# Patient Record
Sex: FEMALE | Race: ASIAN | NOT HISPANIC OR LATINO | Employment: FULL TIME | URBAN - METROPOLITAN AREA
[De-identification: names, ages, dates, MRNs, and addresses within clinical notes are randomized per-mention and may not be internally consistent; named-entity substitution may affect disease eponyms.]

---

## 2022-01-06 ENCOUNTER — TRANSFERRED RECORDS (OUTPATIENT)
Dept: HEALTH INFORMATION MANAGEMENT | Facility: CLINIC | Age: 37
End: 2022-01-06
Payer: COMMERCIAL

## 2022-01-07 ENCOUNTER — TRANSFERRED RECORDS (OUTPATIENT)
Dept: HEALTH INFORMATION MANAGEMENT | Facility: CLINIC | Age: 37
End: 2022-01-07
Payer: COMMERCIAL

## 2022-01-11 DIAGNOSIS — R42 DIZZINESS: Primary | ICD-10-CM

## 2022-01-11 NOTE — TELEPHONE ENCOUNTER
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 VEMP and WIN before appt    RECORDS REQUESTED FROM:       Clinic name Comments Records Status Imaging Status   [facility]  1/7/22 -  12/2/21 notes from Sharri IRVIN  1/7/22 audiogram  Emailed MONE 1/11/22 - req 1/12/22 - received     [facility]  1/6/22 CT Tem[facility] everywhere  req 1/11/22- PACS                             1/11/22 3:23PM sent a fax to [facility] for images, emailed MONE to patient - Amay   1/12/22 2:11pm images received in PACS, signed MONE received via fax, sent a fax for recs - Amay  *recs from [facility] received and sent to scan - Amay

## 2022-01-17 ENCOUNTER — OFFICE VISIT (OUTPATIENT)
Dept: AUDIOLOGY | Facility: CLINIC | Age: 37
End: 2022-01-17
Attending: OTOLARYNGOLOGY
Payer: COMMERCIAL

## 2022-01-17 DIAGNOSIS — H93.8X2 EAR PRESSURE, LEFT: Primary | ICD-10-CM

## 2022-01-17 PROCEDURE — 92519 VEMP TST I&R CERVICAL&OCULAR: CPT | Performed by: AUDIOLOGIST

## 2022-01-17 PROCEDURE — 92567 TYMPANOMETRY: CPT | Performed by: AUDIOLOGIST

## 2022-01-17 NOTE — PROGRESS NOTES
.AUDIOLOGY REPORT    SUBJECTIVE: Ankita Quintana was seen in the Audiology Clinic at the [facility] on 1/17/2022 for a vestibular evoked myogenic potential (VEMP) evaluation, referred by Ama Cosby M.D. Ankita reports onset of left ear pain mid November 2021.  She reports being diagnosed with an abscess on her left pinna near the entrance of the ear canal.  She was seen at [facility] and the abscess was drained.  She reports that abscess recurred and was drained again in early to mid December.  She notes the ear pain radiated up the left side of her head and also into her neck as well as having ear and head fullness on that side.  She reports a zapping feeling when her left ear is touched on the area where the abscess was.  She reports the neck pain and stiffness worsens when turning her head to the left.  She reports continued fluctuating symptoms of pressure/fullness on the left side of her head as well as pain that radiates to the neck, and tingling on that side of the face that comes and goes with the other symptoms.  She reports that her symptoms seem to be worse toward the end of the day, and are better upon waking up in the morning.  She reports being treated with steroids recently (Medrol Dosepak), and feels the symptoms may have gotten a little better since then.  She reports frequent popping in the left ear as well.  She also notes tenderness on the left side of her scalp.  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 she denies any dizziness or issues with imbalance.  She denies any falls in the past year.  She does not have any symptoms triggered by loud sounds, or pressure changes such as coughing, sneezing, or blowing her nose.  She denies autophony, or hearing her eye blinks.     The patient reports a history of migraines which were more frequent  in the past.  She reports having occasional migraines now, however has not experienced one since the onset of her current symptoms.  She denies vision concerns (blurred or double vision), history of eye surgeries, head trauma, or other major medical conditions.    Hearing evaluations have revealed normal hearing bilaterally.  The patient denies fluctuations in hearing, tinnitus, or drainage from the ears.  She reports having a left myringotomy about 10 to 15 years ago, and denies any other history of ear surgeries.    OBJECTIVE:     VEMP testing is performed using an auditory evoked potentials system. Surface electrodes are placed in several locations on the patient's head and neck in order to record muscle motoneuron activity in response to loud auditory stimuli. This testing assesses very specific vestibular pathways in the inner ear and central nervous system. cVEMP testing is performed with electrodes placed on the patient's sternocleidomastoid muscle, and is used to assess the saccular organ, afferent inferior vestibular nerve and vestibular nuclei within the brainstem. oVEMP testing is performed with electrodes placed under the patient's eyes, and is used to assess the utricle and afferent superior vestibular nerve and nuclei within the brainstem.  Patients that may benefit from this procedure are those with complaints of vertigo and imbalance or those suspected of superior canal dehiscence. A total of 90 minutes was spent completing the VEMP testing today.    Tympanograms      RIGHT: normal eardrum mobility bilaterally.     LEFT: normal eardrum mobility bilaterally    cVEMP Thresholds via 500 Hz toneburst:    RIGHT: 85 dB nHL which  suggest normal saccular and inferior vestibular nerve function    LEFT: 90 dB nHL which  suggest normal saccular and inferior vestibular nerve function    AMPLITUDE ASYMMETRY: 25%, right more robust (greater than 33% is considered abnormal)    oVEMP Thresholds via 500 Hz  toneburst:     RIGHT: 90 dB nHL which suggests normal utricle and superior vestibular nerve function    LEFT: 90 dB nHL which suggests normal utricle and superior vestibular nerve function    AMPLITUDE ASYMMETRY: 15%, right more robust (greater than 33% is considered abnormal)    ASSESSMENT: Today's results suggest a normal VEMP evaluation. These results suggest normal saccular and inferior vestibular nerve function and normal utricle and superior vestibular nerve function.      PLAN:The patient will follow up with Ama Cosby M.D. for medical management. Please call this clinic with questions regarding these results or recommendations.      Mariola Gonzalez.  Licensed Audiologist  [place] # [id number]

## 2022-01-18 ENCOUNTER — OFFICE VISIT (OUTPATIENT)
Dept: AUDIOLOGY | Facility: CLINIC | Age: 37
End: 2022-01-18
Payer: COMMERCIAL

## 2022-01-18 ENCOUNTER — OFFICE VISIT (OUTPATIENT)
Dept: OTOLARYNGOLOGY | Facility: CLINIC | Age: 37
End: 2022-01-18
Payer: COMMERCIAL

## 2022-01-18 ENCOUNTER — PRE VISIT (OUTPATIENT)
Dept: OTOLARYNGOLOGY | Facility: CLINIC | Age: 37
End: 2022-01-18
Payer: COMMERCIAL

## 2022-01-18 VITALS
DIASTOLIC BLOOD PRESSURE: 82 MMHG | OXYGEN SATURATION: 100 % | BODY MASS INDEX: 24.32 KG/M2 | HEART RATE: 82 BPM | SYSTOLIC BLOOD PRESSURE: 120 MMHG | WEIGHT: 146 LBS | TEMPERATURE: 97.8 F | HEIGHT: 65 IN

## 2022-01-18 DIAGNOSIS — Q18.1 AURICULAR CYST: Primary | ICD-10-CM

## 2022-01-18 DIAGNOSIS — R42 DIZZINESS: ICD-10-CM

## 2022-01-18 DIAGNOSIS — R93.89 ABNORMAL FINDING ON CT SCAN: ICD-10-CM

## 2022-01-18 DIAGNOSIS — H93.8X2 EAR PRESSURE, LEFT: Primary | ICD-10-CM

## 2022-01-18 PROCEDURE — 99203 OFFICE O/P NEW LOW 30 MIN: CPT | Mod: 25 | Performed by: OTOLARYNGOLOGY

## 2022-01-18 PROCEDURE — 92557 COMPREHENSIVE HEARING TEST: CPT | Performed by: AUDIOLOGIST

## 2022-01-18 PROCEDURE — 92550 TYMPANOMETRY & REFLEX THRESH: CPT | Performed by: AUDIOLOGIST

## 2022-01-18 PROCEDURE — 92504 EAR MICROSCOPY EXAMINATION: CPT | Performed by: OTOLARYNGOLOGY

## 2022-01-18 ASSESSMENT — MIFFLIN-ST. JEOR: SCORE: 1358.13

## 2022-01-18 ASSESSMENT — PAIN SCALES - GENERAL: PAINLEVEL: NO PAIN (0)

## 2022-01-18 NOTE — PROGRESS NOTES
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  Consultation was requested by self. The patient was first seen on 2021 at which time she reported approximately one month of left otalgia, left ear pressure, and associated muffled hearing with left canal edema and clear otorrhea. She used Ciprodex drops for seven days with significant improvement, but there were still two persistent cystic lesions in the left conchal bowl which had darkened and were tender to palpation. Additionally, she had recurrence in her throbbing otalgia.     She was then seen on 12/10/2021, at which time she reported continued left ear swelling as well as throbbing otalgia in the left ear, muffled hearing, and left canal edema. At this time, the 2 left conchal bowl cysts were bleeding and draining clear liquid. Bacitracin was used as well as ibuprofen without benefit. At this visit she had an I&D performed.     She was then seen on 2022 following her I&D reporting that she was doing well but had recurrence of the left otalgia which was then radiating into the back of her head and down the left side of her neck. She was then seen on 2022 with similar persisting symptoms and longstanding mild intermittent left ear pressure.     Today, the patient reports that two months ago this started with an abscess that she had never experienced before. This abscess came back and was again drained. Along with this came associated ear pain, pressure, left sided head pressure, and left sided scalp tenderness. She then took Keflex and her symptoms improved. Now she explained that when her triangular fossa is palpated, she feels a shock or shooting pain sensation. She continues to have left sided,  "radiating pain and therefore a CT was completed. Additionally, she explained that with talking (towards the time of onset of other symptoms), she noted a fullness in her ear which has somewhat improved. The patient explained that she does use a stethoscope often but does not otherwise use ear buds. She has no other symptoms like this in the past. The patient feels like she is getting better but is not back to baseline. She denies any dizziness.     Review of Systems:   Pertinent items are noted in HPI or as in patient entered ROS below, remainder of complete ROS is negative.    ENT ROS 1/18/2022   Neurology Headache   Ears, Nose, Throat Ear pain        Active Medications:   No current outpatient medications on file.      Allergies:   Penicillins      Past Medical History:  No past medical history on file.  There is no problem list on file for this patient.    Past Surgical History:  No past surgical history on file.    Family History:   No family history on file.      Social History:   Social History     Tobacco Use     Smoking status: Never Smoker     Smokeless tobacco: Never Used   Substance Use Topics     Alcohol use: Yes     Alcohol/week: 2.0 standard drinks     Types: 2 Standard drinks or equivalent per week     Drug use: None        Physical Exam:   /82   Pulse 82   Temp 97.8  F (36.6  C)   Ht 1.651 m (5' 5\")   Wt 66.2 kg (146 lb)   SpO2 100%   BMI 24.30 kg/m       Constitutional:  The patient was accompanied by her , well-groomed, and in no acute distress.     Skin: Normal:  warm and pink without rash   Neurologic: Alert and oriented x 3.  CN's III-XII within normal limits.  Voice normal.    Psychiatric: The patient's affect was calm, cooperative, and appropriate.     Communication:  Normal; communicates verbally, normal voice quality.   Respiratory: Breathing comfortably without stridor or exertion of accessory muscles.    Eyes: Pupils were equal and reactive.  Extraocular movement " intact.     Ears: Pinnae and tragus non-tender.         Otologic microscope exam:  Right ear: TM and EAC clear. No fluid behind the TM and there are no signs of active infection.  Left ear: No subcutaneous cyst present. No seroma present either. Skin is healthy. Very healthy skin on the oracle, no recurrance.     Audiogram:  AUDIOGRAM: She underwent an audiogram today. This demonstrated:      Right: Speech reception threshold is 5 dB with 96% word recognition at 50 dB. Tympanogram A type   Left: Speech reception threshold is 5 dB with 100% word recognition at 50 dB. Tympanogram A type     Audiogram was independently reviewed.    VEMP  1/17/2022  cVEMP Thresholds via 500 Hz toneburst:    RIGHT: 85 dB nHL which  suggest normal saccular and inferior vestibular nerve function    LEFT: 90 dB nHL which  suggest normal saccular and inferior vestibular nerve function    AMPLITUDE ASYMMETRY: 25%, right more robust (greater than 33% is considered abnormal)     oVEMP Thresholds via 500 Hz toneburst:     RIGHT: 90 dB nHL which suggests normal utricle and superior vestibular nerve function    LEFT: 90 dB nHL which suggests normal utricle and superior vestibular nerve function    AMPLITUDE ASYMMETRY: 15%, right more robust (greater than 33% is considered abnormal)    VEMP was independently reviewed.  Results are normal with no evidence of physiologically active third window.    Imaging:  CT Temporal Bones without, 01/06/2022  Impression:   1. Unremarkable mastoid air cells and middle ear cavities bilaterally.   2. 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      Outside records from [facility] were independently reviewed.       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    We reviewed the CT scan in detail showing thinning of the bone over the canal. It is not reformatted in the plane of or  perpendicular to the canal, which would provide more definitive evidence for or against dehiscence. Currently, she does not have typicaly symptoms of canal dehiscence/third window lesion. Her audiogram and VEMP testing were reviewed and also do not show electrophysiological signs of a third window. Thus, no intervention is needed and this can be managed expectantly for now.  I am also reassured that the skin exam looks normal and auricle is healed.  I would be happy to see her as needed for additional concerns.     Ama Cosby MD  Otology & Neurotology  [facility]       Scribe Disclosure:  I, Edel Trinidad, am serving as a scribe to document services personally performed by Ama Cosby MD at this visit, based upon the provider's statements to me. All documentation has been reviewed by the aforementioned provider prior to being entered into the official medical record.    The documentation recorded by the scribe accurately reflects the services I personally performed and the decisions made by me.

## 2022-01-18 NOTE — PATIENT INSTRUCTIONS
1. You were seen in the ENT Clinic today by Dr. Cosby. If you have any questions or concerns after your appointment, please call   - Option 1: ENT Clinic: [phone]  - Option 2: Marlene (Dr. Cosby' Nurse): [phone]  - Option 3: Livia (Dr. Cosby' Nurse): [phone]     2.   Plan to return to clinic as needed    Marlene RN, BSN  ENT RN Care Coordinator  [facility]

## 2022-01-18 NOTE — Clinical Note
"2022       RE: Ankita Quintana  [address]  [place] [address]     Dear Colleague,    Thank you for referring your patient, Ankita Quintana, to the [facility] [place] at [facility]. Please see a copy of my visit note below.      Neurotology Clinic      Name: Ankita Quintana  MRN: [id number]  Age: 36 year old  : 1985  Referring provider: Self  2022      Chief Complaint:   Consultation    History of Present Illness:   Ankita Quintana is a 36 year old female who presents for consultation regarding SCD on CT.  Consultation was requested by self. The patient was first seen on 2021 at which time she reported approximately one month of left otalgia, left ear pressure, and associated muffled hearing with left canal edema and clear otorrhea. She used Ciprodex drops for seven days with significant improvement, but there were still two persistent \"cysts\" in the left conchal bowl which had darkened and were tender to palpation. Additionally, she had recurrence in her throbbing otalgia.     She was then seen on 12/10/2021, at which time she reported continued left ear swelling as well as \"throbbing\" otalgia in the left ear, muffled hearing, and left canal edema. At this time, the 2 left conchal bowl cysts were bleeding and draining clear liquid. Bacitracin was used as well as ibuprofen without benefit. At this visit she had an I&D performed.     She was then seen on 2022 following her I&D reporting that she was doing well but had recurrence of the left otalgia which was then radiating into the back of her head and down the left side of her neck. She was then seen on 2022 with similar persisting symptoms and longstanding mild intermittent left ear pressure.     Today, the patient reports ***.     Review of Systems:   Pertinent items are noted in HPI or as in patient entered ROS below, remainder of complete " ROS is negative.   No flowsheet data found.     Active Medications:   No current outpatient medications on file.      Allergies:   Patient has no allergy information on record.      Past Medical History:  No past medical history on file.  There is no problem list on file for this patient.       Past Surgical History:  No past surgical history on file.    Family History:   No family history on file.      Social History:   Social History     Tobacco Use     Smoking status: Not on file     Smokeless tobacco: Not on file   Substance Use Topics     Alcohol use: Not on file     Drug use: Not on file        Physical Exam:   There were no vitals taken for this visit.       Constitutional:  The patient was ***unaccompanied, well-groomed, and in no acute distress.     Skin: Normal:  warm and pink without rash   Neurologic: Alert and oriented x 3.  CN's III-XII within normal limits.  Voice normal.    Psychiatric: The patient's affect was calm, cooperative, and appropriate.     Communication:  Normal; communicates verbally, normal voice quality.   Respiratory: Breathing comfortably without stridor or exertion of accessory muscles.    Head/Face:  No lesions or scars. No sinus tenderness.    Salivary glands -  Normal size, no tenderness, swelling, or palpable masses***   Eyes: Pupils were equal and reactive.  Extraocular movement intact.     Ears: Pinnae and tragus non-tender.  ***EAC's and TM's were clear.        Otologic microscope exam:  Right ear: ***  Left ear: ***     Audiogram:  AUDIOGRAM: She underwent an audiogram today. This demonstrated:  ***    Right: Speech reception threshold is *** dB with ***% word recognition. Tympanogram *** type   Left: Speech reception threshold is *** dB with ***% word recognition. Tympanogram *** type     Audiogram was independently reviewed    Imaging:  CT Temporal Bones without, 01/06/2022  Impression:   1. Unremarkable mastoid air cells and middle ear cavities bilaterally.   2. 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      Outside records from [facility] were independently reviewed.       Assessment and Plan:  Ankita Quintana is a 36 year old female ***    Follow-up: No follow-ups on file.         Scribe Disclosure:  Edel CRAWFORD, am serving as a scribe to document services personally performed by Ama Cosby MD at this visit, based upon the provider's statements to me. All documentation has been reviewed by the aforementioned provider prior to being entered into the official medical record.    Scribe Preparation Attestation:  Edel CRAWFORD, a scribe, prepared the chart for today's encounter. 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  Consultation was requested by self. The patient was first seen on 2021 at which time she reported approximately one month of left otalgia, left ear pressure, and associated muffled hearing with left canal edema and clear otorrhea. She used Ciprodex drops for seven days with significant improvement, but there were still two persistent \"cysts\" in the left conchal bowl which had darkened and were tender to palpation. Additionally, she had recurrence in her throbbing otalgia.     She was then seen on 12/10/2021, at which time she reported continued left ear swelling as well as \"throbbing\" otalgia in the left ear, muffled hearing, and left canal edema. At this time, the 2 left conchal bowl cysts were bleeding and draining clear liquid. Bacitracin was used as well as ibuprofen without benefit. At " this visit she had an I&D performed.     She was then seen on 01/06/2022 following her I&D reporting that she was doing well but had recurrence of the left otalgia which was then radiating into the back of her head and down the left side of her neck. She was then seen on 01/07/2022 with similar persisting symptoms and longstanding mild intermittent left ear pressure.     Today, the patient reports ***.     Review of Systems:   Pertinent items are noted in HPI or as in patient entered ROS below, remainder of complete ROS is negative.   No flowsheet data found.     Active Medications:   No current outpatient medications on file.      Allergies:   Patient has no allergy information on record.      Past Medical History:  No past medical history on file.  There is no problem list on file for this patient.       Past Surgical History:  No past surgical history on file.    Family History:   No family history on file.      Social History:   Social History     Tobacco Use     Smoking status: Not on file     Smokeless tobacco: Not on file   Substance Use Topics     Alcohol use: Not on file     Drug use: Not on file        Physical Exam:   There were no vitals taken for this visit.       Constitutional:  The patient was ***unaccompanied, well-groomed, and in no acute distress.     Skin: Normal:  warm and pink without rash   Neurologic: Alert and oriented x 3.  CN's III-XII within normal limits.  Voice normal.    Psychiatric: The patient's affect was calm, cooperative, and appropriate.     Communication:  Normal; communicates verbally, normal voice quality.   Respiratory: Breathing comfortably without stridor or exertion of accessory muscles.    Head/Face:  No lesions or scars. No sinus tenderness.    Salivary glands -  Normal size, no tenderness, swelling, or palpable masses***   Eyes: Pupils were equal and reactive.  Extraocular movement intact.     Ears: Pinnae and tragus non-tender.  ***EAC's and TM's were clear.         Otologic microscope exam:  Right ear: ***  Left ear: ***     Audiogram:  AUDIOGRAM: She underwent an audiogram today. This demonstrated:      Right: Speech reception threshold is 5 dB with 96% word recognition at 50 dB. Tympanogram A type   Left: Speech reception threshold is 5 dB with 100% word recognition at 50 dB. Tympanogram A type     Audiogram was independently reviewed    Imaging:  CT Temporal Bones without, 01/06/2022  Impression:   1. Unremarkable mastoid air cells and middle ear cavities bilaterally.   2. 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      Outside records from [facility] were independently reviewed.       Assessment and Plan:  Ankita Quintana is a 36 year old female ***    Follow-up: No follow-ups on file.         Scribe Disclosure:  Edel CRAWFORD, am serving as a scribe to document services personally performed by Ama Cosby MD at this visit, based upon the provider's statements to me. All documentation has been reviewed by the aforementioned provider prior to being entered into the official medical record.    Scribe Preparation Attestation:  Edel CRAWFORD, a scribe, prepared the chart for today's encounter. ***     {ENT ATTESTATION:35231957}    {[facility] 2021 Documentation (Optional):447334}  {2021 E&M time (Optional):052397}  {Provider  Link to [facility] Help Grid :443570}      Again, thank you for allowing me to participate in the care of your patient.      Sincerely,    Ama Cosby MD

## 2022-01-18 NOTE — PROGRESS NOTES
AUDIOLOGY REPORT    SUMMARY: Audiology visit completed. See audiogram for results.      RECOMMENDATIONS: Follow-up with ENT.    Kellie Mckenzie M.A.   Audiology Doctoral Student #[id number]     I was present with the patient for the entire Audiology appointment including all procedures/testing performed by the AuD student, and agree with the student s assessment and plan as documented.     Mariola Gonzalez.  Licensed Audiologist  [place] # [id number]

## 2022-01-18 NOTE — NURSING NOTE
"Chief Complaint   Patient presents with     Consult     SCD on CT      Blood pressure 120/82, pulse 82, temperature 97.8  F (36.6  C), height 1.651 m (5' 5\"), weight 66.2 kg (146 lb), SpO2 100 %.    Ángel Patel LPN    "

## 2022-01-18 NOTE — LETTER
Date:February 3, 2022      Patient was self referred, no letter generated. Do not send.        [facility] Health Information

## 2022-02-06 ENCOUNTER — HEALTH MAINTENANCE LETTER (OUTPATIENT)
Age: 37
End: 2022-02-06

## 2022-06-24 DIAGNOSIS — N64.89 BREAST ASYMMETRY: Primary | ICD-10-CM

## 2022-06-28 ENCOUNTER — HOSPITAL ENCOUNTER (OUTPATIENT)
Dept: MAMMOGRAPHY | Facility: CLINIC | Age: 37
Discharge: HOME | End: 2022-06-28
Attending: NURSE PRACTITIONER
Payer: COMMERCIAL

## 2022-06-28 DIAGNOSIS — N64.89 BREAST ASYMMETRY: ICD-10-CM

## 2022-06-28 PROCEDURE — 77066 DX MAMMO INCL CAD BI: CPT

## 2022-10-03 ENCOUNTER — HEALTH MAINTENANCE LETTER (OUTPATIENT)
Age: 37
End: 2022-10-03

## 2022-11-28 ENCOUNTER — ANCILLARY PROCEDURE (OUTPATIENT)
Dept: GENERAL RADIOLOGY | Facility: CLINIC | Age: 37
End: 2022-11-28
Attending: OBSTETRICS & GYNECOLOGY
Payer: COMMERCIAL

## 2022-11-28 DIAGNOSIS — Z31.49 INVESTIGATION AND TESTING FOR PROCREATION MANAGEMENT: ICD-10-CM

## 2022-11-28 PROCEDURE — 74740 X-RAY FEMALE GENITAL TRACT: CPT

## 2022-12-12 DIAGNOSIS — Z71.84 TRAVEL ADVICE ENCOUNTER: ICD-10-CM

## 2022-12-12 DIAGNOSIS — Z71.84 COUNSELING FOR TRAVEL: Primary | ICD-10-CM

## 2022-12-12 RX ORDER — ONDANSETRON 4 MG/1
4 TABLET, ORALLY DISINTEGRATING ORAL EVERY 6 HOURS PRN
Qty: 30 TABLET | Refills: 0 | Status: SHIPPED | OUTPATIENT
Start: 2022-12-12

## 2022-12-12 RX ORDER — AZITHROMYCIN 250 MG/1
500 TABLET, FILM COATED ORAL DAILY
Qty: 20 TABLET | Refills: 0 | Status: SHIPPED | OUTPATIENT
Start: 2022-12-12 | End: 2022-12-22

## 2022-12-12 RX ORDER — CEPHALEXIN 500 MG/1
500 CAPSULE ORAL 3 TIMES DAILY
Qty: 42 CAPSULE | Refills: 0 | Status: SHIPPED | OUTPATIENT
Start: 2022-12-12 | End: 2022-12-26

## 2023-03-01 ENCOUNTER — TELEPHONE (OUTPATIENT)
Dept: FAMILY MEDICINE | Facility: CLINIC | Age: 38
End: 2023-03-01
Payer: COMMERCIAL

## 2023-03-01 DIAGNOSIS — Z20.822 ENCOUNTER FOR LABORATORY TESTING FOR COVID-19 VIRUS: Primary | ICD-10-CM

## 2023-03-06 ENCOUNTER — LAB (OUTPATIENT)
Dept: FAMILY MEDICINE | Facility: CLINIC | Age: 38
End: 2023-03-06
Attending: INTERNAL MEDICINE
Payer: COMMERCIAL

## 2023-03-06 DIAGNOSIS — Z20.822 ENCOUNTER FOR LABORATORY TESTING FOR COVID-19 VIRUS: ICD-10-CM

## 2023-03-06 LAB — SARS-COV-2 RNA RESP QL NAA+PROBE: NEGATIVE

## 2023-03-06 PROCEDURE — U0005 INFEC AGEN DETEC AMPLI PROBE: HCPCS

## 2023-03-06 PROCEDURE — 99207 PR NO CHARGE NURSE ONLY: CPT

## 2023-03-06 PROCEDURE — U0003 INFECTIOUS AGENT DETECTION BY NUCLEIC ACID (DNA OR RNA); SEVERE ACUTE RESPIRATORY SYNDROME CORONAVIRUS 2 (SARS-COV-2) (CORONAVIRUS DISEASE [COVID-19]), AMPLIFIED PROBE TECHNIQUE, MAKING USE OF HIGH THROUGHPUT TECHNOLOGIES AS DESCRIBED BY CMS-2020-01-R: HCPCS

## 2023-08-01 ENCOUNTER — TRANSFERRED RECORDS (OUTPATIENT)
Dept: HEALTH INFORMATION MANAGEMENT | Facility: CLINIC | Age: 38
End: 2023-08-01
Payer: COMMERCIAL

## 2023-10-22 ENCOUNTER — HEALTH MAINTENANCE LETTER (OUTPATIENT)
Age: 38
End: 2023-10-22

## 2024-01-19 ENCOUNTER — HOSPITAL ENCOUNTER (OUTPATIENT)
Dept: MAMMOGRAPHY | Facility: CLINIC | Age: 39
Discharge: HOME | End: 2024-01-19
Attending: STUDENT IN AN ORGANIZED HEALTH CARE EDUCATION/TRAINING PROGRAM
Payer: COMMERCIAL

## 2024-01-19 DIAGNOSIS — N64.89 OTHER SPECIFIED DISORDERS OF BREAST: ICD-10-CM

## 2024-01-19 PROCEDURE — 77062 BREAST TOMOSYNTHESIS BI: CPT

## 2024-12-14 ENCOUNTER — HEALTH MAINTENANCE LETTER (OUTPATIENT)
Age: 39
End: 2024-12-14

## 2025-02-26 ENCOUNTER — VIRTUAL VISIT (OUTPATIENT)
Dept: FAMILY MEDICINE | Facility: CLINIC | Age: 40
End: 2025-02-26
Payer: COMMERCIAL

## 2025-02-26 ENCOUNTER — MYC MEDICAL ADVICE (OUTPATIENT)
Dept: FAMILY MEDICINE | Facility: CLINIC | Age: 40
End: 2025-02-26

## 2025-02-26 DIAGNOSIS — M54.9 ACUTE ON CHRONIC BACK PAIN: ICD-10-CM

## 2025-02-26 DIAGNOSIS — M99.33 OSSEOUS STENOSIS OF NEURAL CANAL OF LUMBAR REGION: Primary | ICD-10-CM

## 2025-02-26 DIAGNOSIS — G89.29 ACUTE ON CHRONIC BACK PAIN: ICD-10-CM

## 2025-02-26 PROCEDURE — 98001 SYNCH AUDIO-VIDEO NEW LOW 30: CPT | Performed by: FAMILY MEDICINE

## 2025-02-26 NOTE — PROGRESS NOTES
Ankita is a 39 year old who is being evaluated via a billable video visit.    How would you like to obtain your AVS? MyChart  If the video visit is dropped, the invitation should be resent by: Text to cell phone: [phone]  Will anyone else be joining your video visit? 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  Has been having recurrence of back pain with static posture at work,  indicated to use height adjustable standing desk for there long term lumbar issue, she had past H/O wearing lumbar supports and brace for scoliosis when she was teenager, has no other neurologic sx currently  We will supports her to request height adjustable standing desk for her work  via medical opinion letter    Acute on chronic back pain  Mentioned above       FUTURE APPOINTMENTS:       - Follow-up visit as needed     Subjective   Ankita is a 39 year old, presenting for the following health issues:  No chief complaint on file.      Via the Health Maintenance questionnaire, the patient has reported the following services have been completed -Cervical Cancer Screening: [facility] 2020-06-01, this information has been sent to the abstraction team.  History of Present Illness       Reason for visit:  Sit stand desk-letter for work    She eats 2-3 servings of fruits and vegetables daily.She consumes 0 sweetened beverage(s) daily.She exercises with enough effort to increase her heart rate 30 to 60 minutes per day.  She exercises with enough effort to increase her heart rate 5 days per week.   She is taking medications regularly.     Pt needs a letter for desk at work due to scoliosis       Review of Systems  Constitutional, HEENT, cardiovascular, pulmonary, GI, , musculoskeletal, neuro, skin, endocrine and psych systems are negative, except as otherwise noted.      Objective           Vitals:  No vitals were obtained today due to virtual visit.    Physical Exam   GENERAL: alert  and no distress  EYES: Eyes grossly normal to inspection.  No discharge or erythema, or obvious scleral/conjunctival abnormalities.  RESP: No audible wheeze, cough, or visible cyanosis.    SKIN: Visible skin clear. No significant rash, abnormal pigmentation or lesions.  NEURO: Cranial nerves grossly intact.  Mentation and speech appropriate for age.  PSYCH: Appropriate affect, tone, and pace of words          Video-Visit Details    Type of service:  Video Visit   Originating Location (pt. Location): Home    Distant Location (provider location):  On-site  Platform used for Video Visit: Eagle  Signed Electronically by: Yobani Santoyo MD

## 2025-02-26 NOTE — LETTER
February 26, 2025      Ankita Quintana  [address] [address]        To Whom It May Concern,     I support this patient having a sit-stand desk for a medical reason and therapeutic purpose. If you need additional assistance, feel free to contact us.       Sincerely,        Yobani Santoyo MD    Electronically signed

## 2025-02-26 NOTE — LETTER
February 26, 2025      Ankita Quintana  [address] [address]        To Whom It May Concern,       Ms Ankita Quintana has chronic back pain associated with neural canal stenosis of lumbar region. We strongly recommend her to use height adjustable standing desk for the medical reason and therapeutic purpose. Please consider to accept the suggestion for her. If you need additional assistance, feel free to contact us.       Sincerely,        Yobani Santoyo MD    Electronically signed

## 2025-02-27 NOTE — TELEPHONE ENCOUNTER
Sent updated letter to pt via [employer].      Does pt want original mailed or available for pick-up in-person?    Rcvd reply from pt.  [employer] letter fine.  Original filed Tm 3.     Radha Castro on 2/27/2025 at 11:38 AM

## 2025-03-28 NOTE — PROGRESS NOTES
Spoke with pt. Over 1 year of left breast enlargement. No lumps, pain or discharge. No FMH of breast cancer. Ordering imaging.    ANDRES Buckley CNP    numerical 0-10